# Patient Record
Sex: MALE | Race: WHITE | NOT HISPANIC OR LATINO | URBAN - METROPOLITAN AREA
[De-identification: names, ages, dates, MRNs, and addresses within clinical notes are randomized per-mention and may not be internally consistent; named-entity substitution may affect disease eponyms.]

---

## 2021-12-21 ENCOUNTER — OFFICE VISIT (OUTPATIENT)
Dept: URBAN - METROPOLITAN AREA TELEHEALTH 2 | Facility: TELEHEALTH | Age: 46
End: 2021-12-21

## 2022-03-17 PROBLEM — 275978004 COLON CANCER SCREENING: Status: ACTIVE | Noted: 2022-03-17

## 2022-04-07 ENCOUNTER — OFFICE VISIT (OUTPATIENT)
Dept: URBAN - NONMETROPOLITAN AREA SURGERY CENTER 1 | Facility: SURGERY CENTER | Age: 47
End: 2022-04-07
Payer: COMMERCIAL

## 2022-04-07 ENCOUNTER — TELEPHONE ENCOUNTER (OUTPATIENT)
Dept: URBAN - NONMETROPOLITAN AREA CLINIC 2 | Facility: CLINIC | Age: 47
End: 2022-04-07

## 2022-04-07 ENCOUNTER — OFFICE VISIT (OUTPATIENT)
Dept: URBAN - NONMETROPOLITAN AREA SURGERY CENTER 1 | Facility: SURGERY CENTER | Age: 47
End: 2022-04-07

## 2022-04-07 DIAGNOSIS — D12.4 ADENOMA OF DESCENDING COLON: ICD-10-CM

## 2022-04-07 DIAGNOSIS — D12.3 ADENOMA OF TRANSVERSE COLON: ICD-10-CM

## 2022-04-07 DIAGNOSIS — D12.5 ADENOMA OF SIGMOID COLON: ICD-10-CM

## 2022-04-07 DIAGNOSIS — Z12.11 COLON CANCER SCREENING: ICD-10-CM

## 2022-04-07 DIAGNOSIS — D12.2 ADENOMA OF ASCENDING COLON: ICD-10-CM

## 2022-04-07 PROCEDURE — 45385 COLONOSCOPY W/LESION REMOVAL: CPT | Performed by: INTERNAL MEDICINE

## 2022-04-07 PROCEDURE — G8907 PT DOC NO EVENTS ON DISCHARG: HCPCS | Performed by: INTERNAL MEDICINE

## 2022-04-18 ENCOUNTER — TELEPHONE ENCOUNTER (OUTPATIENT)
Dept: URBAN - NONMETROPOLITAN AREA CLINIC 2 | Facility: CLINIC | Age: 47
End: 2022-04-18

## 2022-06-01 ENCOUNTER — LAB OUTSIDE AN ENCOUNTER (OUTPATIENT)
Dept: URBAN - NONMETROPOLITAN AREA CLINIC 13 | Facility: CLINIC | Age: 47
End: 2022-06-01

## 2022-06-01 ENCOUNTER — OFFICE VISIT (OUTPATIENT)
Dept: URBAN - NONMETROPOLITAN AREA CLINIC 13 | Facility: CLINIC | Age: 47
End: 2022-06-01
Payer: COMMERCIAL

## 2022-06-01 ENCOUNTER — WEB ENCOUNTER (OUTPATIENT)
Dept: URBAN - NONMETROPOLITAN AREA CLINIC 13 | Facility: CLINIC | Age: 47
End: 2022-06-01

## 2022-06-01 DIAGNOSIS — R19.8 ABDOMINAL FULLNESS: ICD-10-CM

## 2022-06-01 DIAGNOSIS — Z86.010 PERSONAL HISTORY OF COLONIC POLYPS: ICD-10-CM

## 2022-06-01 DIAGNOSIS — K57.90 DIVERTICULOSIS: ICD-10-CM

## 2022-06-01 PROCEDURE — 99213 OFFICE O/P EST LOW 20 MIN: CPT | Performed by: INTERNAL MEDICINE

## 2022-06-01 RX ORDER — POLYETHYLENE GLYCOL 3350, SODIUM SULFATE, SODIUM CHLORIDE, POTASSIUM CHLORIDE, ASCORBIC ACID, SODIUM ASCORBATE 140-9-5.2G
AS DIRECTED KIT ORAL AS DIRECTED
Qty: 280 GRAM | Refills: 0 | OUTPATIENT
Start: 2022-06-01 | End: 2022-06-02

## 2022-06-01 NOTE — HPI-TODAY'S VISIT:
6/1/2022: Gastroenterology Clinic Visit 46 year old gentleman with past medical history of hypertension, hyperlipidemia, personal history of colon polyps who presents following direct access colonoscopy which revealed 13 adenomas.  Mr. Verma did have a colonoscopy at the age of 31 and at the age of 36 but did not have colon polyps.   Father with history of colon polyps.  Denies melena, hematochezia, abdominal pain, unintentional weight loss.  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   Please see patrick for prior colonoscopy.  4/7/2022: Colonoscopy  The terminal ileum was normal. Two sessile polyps were found in the ascending colon measuring 4 to 5 mm. Polypectomy performed. Three sessile polyps were found in the transverse colon measuring 5 to 8 mm. Polypectomy performed. Four sessile polyps were found in the descending colon measuring 5 to 7 mm. Polypectomy performed. Five sessile polyps were found in the sigmoid colon measuring 3 to 7 mm. Polypectomy performed. A few small-mouthed diverticula were found in the sigmoid colon.  Hemorrhoids were found on retroflexion. 4/7/2022: Pathology from Colonoscopy  A Colon, Sigmoid, Polypectomy Multiple fragments of tubular adenomas. Hyperplastic polyp. B Colon, Transverse, Polypectomy Multiple fragments of tubular adenomas. C Colon, Ascending, Polypectomy Tubular adenomas (2). D Colon, Descending, Polypectomy Multiple fragments of tubular adenomas

## 2022-06-15 ENCOUNTER — LAB OUTSIDE AN ENCOUNTER (OUTPATIENT)
Dept: URBAN - NONMETROPOLITAN AREA CLINIC 2 | Facility: CLINIC | Age: 47
End: 2022-06-15

## 2022-06-15 ENCOUNTER — TELEPHONE ENCOUNTER (OUTPATIENT)
Dept: URBAN - NONMETROPOLITAN AREA CLINIC 2 | Facility: CLINIC | Age: 47
End: 2022-06-15

## 2022-06-15 PROBLEM — 118928003: Status: ACTIVE | Noted: 2022-06-15

## 2022-10-14 ENCOUNTER — OFFICE VISIT (OUTPATIENT)
Dept: URBAN - NONMETROPOLITAN AREA SURGERY CENTER 1 | Facility: SURGERY CENTER | Age: 47
End: 2022-10-14
Payer: COMMERCIAL

## 2022-10-14 DIAGNOSIS — D12.2 ADENOMA OF ASCENDING COLON: ICD-10-CM

## 2022-10-14 DIAGNOSIS — Z86.010 ADENOMAS PERSONAL HISTORY OF COLONIC POLYPS: ICD-10-CM

## 2022-10-14 DIAGNOSIS — K63.89 BACTERIAL OVERGROWTH SYNDROME: ICD-10-CM

## 2022-10-14 DIAGNOSIS — D12.0 ADENOMA OF CECUM: ICD-10-CM

## 2022-10-14 PROCEDURE — 45385 COLONOSCOPY W/LESION REMOVAL: CPT | Performed by: INTERNAL MEDICINE

## 2022-10-14 PROCEDURE — G8907 PT DOC NO EVENTS ON DISCHARG: HCPCS | Performed by: INTERNAL MEDICINE

## 2022-10-14 PROCEDURE — 45380 COLONOSCOPY AND BIOPSY: CPT | Performed by: INTERNAL MEDICINE

## 2022-10-27 ENCOUNTER — OFFICE VISIT (OUTPATIENT)
Dept: URBAN - NONMETROPOLITAN AREA CLINIC 2 | Facility: CLINIC | Age: 47
End: 2022-10-27
Payer: COMMERCIAL

## 2022-10-27 VITALS
HEART RATE: 67 BPM | WEIGHT: 250 LBS | DIASTOLIC BLOOD PRESSURE: 97 MMHG | BODY MASS INDEX: 33.13 KG/M2 | SYSTOLIC BLOOD PRESSURE: 148 MMHG | HEIGHT: 73 IN

## 2022-10-27 DIAGNOSIS — K57.90 DIVERTICULOSIS: ICD-10-CM

## 2022-10-27 DIAGNOSIS — Z86.010 PERSONAL HISTORY OF COLONIC POLYPS: ICD-10-CM

## 2022-10-27 DIAGNOSIS — K59.00 CONSTIPATION, UNSPECIFIED CONSTIPATION TYPE: ICD-10-CM

## 2022-10-27 PROCEDURE — 99213 OFFICE O/P EST LOW 20 MIN: CPT | Performed by: INTERNAL MEDICINE

## 2022-10-27 NOTE — HPI-TODAY'S VISIT:
6/1/2022: Gastroenterology Clinic Visit 46 year old gentleman with past medical history of hypertension, hyperlipidemia, personal history of colon polyps who presents following direct access colonoscopy which revealed 13 adenomas.  Mr. Verma did have a colonoscopy at the age of 31 and at the age of 36 but did not have colon polyps.   Father with history of colon polyps.  Denies melena, hematochezia, abdominal pain, unintentional weight loss.  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   Please see patrick for prior colonoscopy.  4/7/2022: Colonoscopy  The terminal ileum was normal. Two sessile polyps were found in the ascending colon measuring 4 to 5 mm. Polypectomy performed. Three sessile polyps were found in the transverse colon measuring 5 to 8 mm. Polypectomy performed. Four sessile polyps were found in the descending colon measuring 5 to 7 mm. Polypectomy performed. Five sessile polyps were found in the sigmoid colon measuring 3 to 7 mm. Polypectomy performed. A few small-mouthed diverticula were found in the sigmoid colon.  Hemorrhoids were found on retroflexion. 4/7/2022: Pathology from Colonoscopy  A Colon, Sigmoid, Polypectomy Multiple fragments of tubular adenomas. Hyperplastic polyp. B Colon, Transverse, Polypectomy Multiple fragments of tubular adenomas. C Colon, Ascending, Polypectomy Tubular adenomas (2). D Colon, Descending, Polypectomy Multiple fragments of tubular adenomas.  6//29/2022: CT Abdomen and Pelvis without Contrast PRESSION: 1.  No ventral abdominal hernia. 2.  Moderate colonic stool burden, suggesting constipation.  10/14/2022: Colonoscopy with 3 mm polyp in the cecum with pathology showing tubular adenoma, 6 mm polyp in the ascending colon with pathology returning as tubular adenoma, two polyps in the descending colon ranging from 3 to 4 mm with pathology returning as hyperplastic colon polyps 6 mm polyp in the sigmoid colon with pathology returning as hyperplastic colonic mucosa, diverticulosis in the sigmoid colon, hemorrhoids on retroflexion.  10/27/2022: Gastroenterology Follow-Up Visit Denies abdominal pain, melena, hematochezia, unintentional weight loss.

## 2023-04-10 ENCOUNTER — OFFICE VISIT (OUTPATIENT)
Dept: URBAN - NONMETROPOLITAN AREA CLINIC 13 | Facility: CLINIC | Age: 48
End: 2023-04-10
Payer: COMMERCIAL

## 2023-04-10 ENCOUNTER — LAB OUTSIDE AN ENCOUNTER (OUTPATIENT)
Dept: URBAN - NONMETROPOLITAN AREA CLINIC 13 | Facility: CLINIC | Age: 48
End: 2023-04-10

## 2023-04-10 ENCOUNTER — DASHBOARD ENCOUNTERS (OUTPATIENT)
Age: 48
End: 2023-04-10

## 2023-04-10 VITALS
HEART RATE: 86 BPM | BODY MASS INDEX: 34.14 KG/M2 | TEMPERATURE: 98.3 F | HEIGHT: 73 IN | SYSTOLIC BLOOD PRESSURE: 164 MMHG | DIASTOLIC BLOOD PRESSURE: 107 MMHG | WEIGHT: 257.6 LBS

## 2023-04-10 DIAGNOSIS — K57.30 ACQUIRED DIVERTICULOSIS OF COLON: ICD-10-CM

## 2023-04-10 DIAGNOSIS — K59.09 CHANGE IN BOWEL MOVEMENTS INTERMITTENT CONSTIPATION. URGENCY IN THE MORNING.: ICD-10-CM

## 2023-04-10 DIAGNOSIS — Z86.010 PERSONAL HISTORY OF COLONIC POLYPS: ICD-10-CM

## 2023-04-10 PROBLEM — 428283002: Status: ACTIVE | Noted: 2022-06-01

## 2023-04-10 PROBLEM — 14760008: Status: ACTIVE | Noted: 2022-11-06

## 2023-04-10 PROBLEM — 397881000: Status: ACTIVE | Noted: 2022-06-13

## 2023-04-10 PROCEDURE — 99214 OFFICE O/P EST MOD 30 MIN: CPT | Performed by: INTERNAL MEDICINE

## 2023-04-10 NOTE — HPI-TODAY'S VISIT:
4/10/2023 Mr. Dhruv Verma is a 47 year old male here for colon cancer screening. It was discussed to see [facility] at his last office visit given his large number of polyps and family hx of colon polyps. He did not follow through with this. He denies any problems with his bowels. He denies any blood in his stool. He denies any abdominal pain. His weight and appetite are stable. His last colonoscopy was 10/2022 with two TA polyps. It was recommended to repeat in 1 year- due 10/2023. CS

## 2023-04-10 NOTE — HPI-OTHER HISTORIES
6/1/2022: Gastroenterology Clinic Visit 46 year old gentleman with past medical history of hypertension, hyperlipidemia, personal history of colon polyps who presents following direct access colonoscopy which revealed 13 adenomas.  Mr. Verma did have a colonoscopy at the age of 31 and at the age of 36 but did not have colon polyps.   Father with history of colon polyps.  Denies melena, hematochezia, abdominal pain, unintentional weight loss.  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   Please see patrick for prior colonoscopy.  4/7/2022: Colonoscopy  The terminal ileum was normal. Two sessile polyps were found in the ascending colon measuring 4 to 5 mm. Polypectomy performed. Three sessile polyps were found in the transverse colon measuring 5 to 8 mm. Polypectomy performed. Four sessile polyps were found in the descending colon measuring 5 to 7 mm. Polypectomy performed. Five sessile polyps were found in the sigmoid colon measuring 3 to 7 mm. Polypectomy performed. A few small-mouthed diverticula were found in the sigmoid colon.  Hemorrhoids were found on retroflexion. 4/7/2022: Pathology from Colonoscopy  A Colon, Sigmoid, Polypectomy Multiple fragments of tubular adenomas. Hyperplastic polyp. B Colon, Transverse, Polypectomy Multiple fragments of tubular adenomas. C Colon, Ascending, Polypectomy Tubular adenomas (2). D Colon, Descending, Polypectomy Multiple fragments of tubular adenomas.  6//29/2022: CT Abdomen and Pelvis without Contrast PRESSION: 1.  No ventral abdominal hernia. 2.  Moderate colonic stool burden, suggesting constipation.  10/14/2022: Colonoscopy with 3 mm polyp in the cecum with pathology showing tubular adenoma, 6 mm polyp in the ascending colon with pathology returning as tubular adenoma, two polyps in the descending colon ranging from 3 to 4 mm with pathology returning as hyperplastic colon polyps 6 mm polyp in the sigmoid colon with pathology returning as hyperplastic colonic mucosa, diverticulosis in the sigmoid colon, hemorrhoids on retroflexion.  10/27/2022: Gastroenterology Follow-Up Visit Denies abdominal pain, melena, hematochezia, unintentional weight loss.

## 2023-10-02 ENCOUNTER — OFFICE VISIT (OUTPATIENT)
Dept: URBAN - NONMETROPOLITAN AREA SURGERY CENTER 1 | Facility: SURGERY CENTER | Age: 48
End: 2023-10-02
Payer: COMMERCIAL

## 2023-10-02 DIAGNOSIS — Z86.010 ADENOMAS PERSONAL HISTORY OF COLONIC POLYPS: ICD-10-CM

## 2023-10-02 DIAGNOSIS — Z86.010 PERSONAL HISTORY OF COLONIC POLYPS: ICD-10-CM

## 2023-10-02 DIAGNOSIS — K57.30 DIVERTICULA OF COLON: ICD-10-CM

## 2023-10-02 PROCEDURE — G0105 COLORECTAL SCRN; HI RISK IND: HCPCS | Performed by: INTERNAL MEDICINE

## 2023-10-02 PROCEDURE — 00811 ANES LWR INTST NDSC NOS: CPT | Performed by: NURSE ANESTHETIST, CERTIFIED REGISTERED

## 2023-10-02 PROCEDURE — G8907 PT DOC NO EVENTS ON DISCHARG: HCPCS | Performed by: INTERNAL MEDICINE

## 2023-10-23 ENCOUNTER — OFFICE VISIT (OUTPATIENT)
Dept: URBAN - NONMETROPOLITAN AREA CLINIC 13 | Facility: CLINIC | Age: 48
End: 2023-10-23

## 2023-10-23 NOTE — HPI-TODAY'S VISIT:
10/23/2023 Mr. Dhruv Verma is a 47 year old male here for colon cancer screening. It was discussed to see [facility] at his last office visit given his large number of polyps and family hx of colon polyps. He did not follow through with this. He denies any problems with his bowels. He denies any blood in his stool. He denies any abdominal pain. His weight and appetite are stable. His last colonoscopy was 10/2022 with two TA polyps. It was recommended to repeat in 1 year- due 10/2023. CS

## 2023-10-23 NOTE — HPI-OTHER HISTORIES
6/1/2022: Gastroenterology Clinic Visit 46 year old gentleman with past medical history of hypertension, hyperlipidemia, personal history of colon polyps who presents following direct access colonoscopy which revealed 13 adenomas.  Mr. Verma did have a colonoscopy at the age of 31 and at the age of 36 but did not have colon polyps.   Father with history of colon polyps.  Denies melena, hematochezia, abdominal pain, unintentional weight loss.  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   Please see patrick for prior colonoscopy.  4/7/2022: Colonoscopy  The terminal ileum was normal. Two sessile polyps were found in the ascending colon measuring 4 to 5 mm. Polypectomy performed. Three sessile polyps were found in the transverse colon measuring 5 to 8 mm. Polypectomy performed. Four sessile polyps were found in the descending colon measuring 5 to 7 mm. Polypectomy performed. Five sessile polyps were found in the sigmoid colon measuring 3 to 7 mm. Polypectomy performed. A few small-mouthed diverticula were found in the sigmoid colon.  Hemorrhoids were found on retroflexion. 4/7/2022: Pathology from Colonoscopy  A Colon, Sigmoid, Polypectomy Multiple fragments of tubular adenomas. Hyperplastic polyp. B Colon, Transverse, Polypectomy Multiple fragments of tubular adenomas. C Colon, Ascending, Polypectomy Tubular adenomas (2). D Colon, Descending, Polypectomy Multiple fragments of tubular adenomas.  6//29/2022: CT Abdomen and Pelvis without Contrast PRESSION: 1.  No ventral abdominal hernia. 2.  Moderate colonic stool burden, suggesting constipation.  10/14/2022: Colonoscopy with 3 mm polyp in the cecum with pathology showing tubular adenoma, 6 mm polyp in the ascending colon with pathology returning as tubular adenoma, two polyps in the descending colon ranging from 3 to 4 mm with pathology returning as hyperplastic colon polyps 6 mm polyp in the sigmoid colon with pathology returning as hyperplastic colonic mucosa, diverticulosis in the sigmoid colon, hemorrhoids on retroflexion.  10/27/2022: Gastroenterology Follow-Up Visit Denies abdominal pain, melena, hematochezia, unintentional weight loss.  4/10/2023 Mr. Dhruv Verma is a 47 year old male here for colon cancer screening. It was discussed to see [facility] at his last office visit given his large number of polyps and family hx of colon polyps. He did not follow through with this. He denies any problems with his bowels. He denies any blood in his stool. He denies any abdominal pain. His weight and appetite are stable. His last colonoscopy was 10/2022 with two TA polyps. It was recommended to repeat in 1 year- due 10/2023. CS  10/2/2023 Colonoscopy: sigmoid diverticulosis. Repeat in 5 years